# Patient Record
Sex: MALE | Race: WHITE | NOT HISPANIC OR LATINO | Employment: OTHER | URBAN - METROPOLITAN AREA
[De-identification: names, ages, dates, MRNs, and addresses within clinical notes are randomized per-mention and may not be internally consistent; named-entity substitution may affect disease eponyms.]

---

## 2018-09-20 ENCOUNTER — APPOINTMENT (EMERGENCY)
Dept: ULTRASOUND IMAGING | Facility: HOSPITAL | Age: 38
End: 2018-09-20

## 2018-09-20 ENCOUNTER — HOSPITAL ENCOUNTER (EMERGENCY)
Facility: HOSPITAL | Age: 38
Discharge: HOME | End: 2018-09-20
Attending: EMERGENCY MEDICINE | Admitting: EMERGENCY MEDICINE

## 2018-09-20 ENCOUNTER — APPOINTMENT (EMERGENCY)
Dept: CT IMAGING | Facility: HOSPITAL | Age: 38
End: 2018-09-20

## 2018-09-20 VITALS
SYSTOLIC BLOOD PRESSURE: 165 MMHG | RESPIRATION RATE: 18 BRPM | BODY MASS INDEX: 27.2 KG/M2 | TEMPERATURE: 97.6 F | HEIGHT: 70 IN | OXYGEN SATURATION: 94 % | HEART RATE: 103 BPM | WEIGHT: 190 LBS | DIASTOLIC BLOOD PRESSURE: 78 MMHG

## 2018-09-20 DIAGNOSIS — S39.94XA: Primary | ICD-10-CM

## 2018-09-20 LAB
ANION GAP BLD CALC-SCNC: 17 MMOL/L (ref 4–13)
BUN BLD-MCNC: 18 MG/DL (ref 5–25)
CA-I BLD-SCNC: 1.16 MMOL/L (ref 1.12–1.32)
CHLORIDE BLD-SCNC: 103 MMOL/L (ref 100–108)
CREAT BLD-MCNC: 0.7 MG/DL (ref 0.6–1.3)
GFR SERPL CREATININE-BSD FRML MDRD: 120 ML/MIN/1.73SQ M
GLUCOSE SERPL-MCNC: 116 MG/DL (ref 65–140)
HCT VFR BLD CALC: 48 % (ref 36.5–49.3)
HGB BLDA-MCNC: 16.3 G/DL (ref 12–17)
PCO2 BLD: 26 MMOL/L (ref 21–32)
POTASSIUM BLD-SCNC: 4 MMOL/L (ref 3.5–5.3)
SODIUM BLD-SCNC: 140 MMOL/L (ref 136–145)
SPECIMEN SOURCE: ABNORMAL

## 2018-09-20 PROCEDURE — 96361 HYDRATE IV INFUSION ADD-ON: CPT

## 2018-09-20 PROCEDURE — 99284 EMERGENCY DEPT VISIT MOD MDM: CPT

## 2018-09-20 PROCEDURE — 85014 HEMATOCRIT: CPT

## 2018-09-20 PROCEDURE — 96375 TX/PRO/DX INJ NEW DRUG ADDON: CPT

## 2018-09-20 PROCEDURE — 74177 CT ABD & PELVIS W/CONTRAST: CPT

## 2018-09-20 PROCEDURE — 76870 US EXAM SCROTUM: CPT

## 2018-09-20 PROCEDURE — 96374 THER/PROPH/DIAG INJ IV PUSH: CPT

## 2018-09-20 PROCEDURE — 80047 BASIC METABLC PNL IONIZED CA: CPT

## 2018-09-20 RX ORDER — NAPROXEN 500 MG/1
500 TABLET ORAL 2 TIMES DAILY WITH MEALS
Qty: 20 TABLET | Refills: 0 | Status: SHIPPED | OUTPATIENT
Start: 2018-09-20 | End: 2018-09-30

## 2018-09-20 RX ORDER — ONDANSETRON 2 MG/ML
4 INJECTION INTRAMUSCULAR; INTRAVENOUS ONCE
Status: COMPLETED | OUTPATIENT
Start: 2018-09-20 | End: 2018-09-20

## 2018-09-20 RX ORDER — FENTANYL CITRATE 50 UG/ML
50 INJECTION, SOLUTION INTRAMUSCULAR; INTRAVENOUS ONCE
Status: COMPLETED | OUTPATIENT
Start: 2018-09-20 | End: 2018-09-20

## 2018-09-20 RX ORDER — KETOROLAC TROMETHAMINE 30 MG/ML
15 INJECTION, SOLUTION INTRAMUSCULAR; INTRAVENOUS ONCE
Status: COMPLETED | OUTPATIENT
Start: 2018-09-20 | End: 2018-09-20

## 2018-09-20 RX ADMIN — SODIUM CHLORIDE 1000 ML: 0.9 INJECTION, SOLUTION INTRAVENOUS at 10:16

## 2018-09-20 RX ADMIN — ONDANSETRON 4 MG: 2 INJECTION INTRAMUSCULAR; INTRAVENOUS at 11:30

## 2018-09-20 RX ADMIN — HYDROMORPHONE HYDROCHLORIDE 1 MG: 1 INJECTION, SOLUTION INTRAMUSCULAR; INTRAVENOUS; SUBCUTANEOUS at 11:30

## 2018-09-20 RX ADMIN — KETOROLAC TROMETHAMINE 15 MG: 30 INJECTION, SOLUTION INTRAMUSCULAR at 11:30

## 2018-09-20 RX ADMIN — IOHEXOL 100 ML: 350 INJECTION, SOLUTION INTRAVENOUS at 10:25

## 2018-09-20 RX ADMIN — FENTANYL CITRATE 50 MCG: 50 INJECTION INTRAMUSCULAR; INTRAVENOUS at 10:42

## 2018-09-20 NOTE — DISCHARGE INSTRUCTIONS
Testicle Pain   WHAT YOU NEED TO KNOW:   Testicle pain may start in your scrotum and spread to your abdomen  You may have sharp, sudden pain or dull pain that happens over time  Your testicle pain may come and go, or it may last for a long time  The cause of your pain may be unknown  Testicle pain can be caused by infection, trauma, hernia, kidney stones, or sexually transmitted infections (STIs)  You may have a painful lump in your scrotum  The lump may be caused by an enlarged vein or fluid that collects around one of your testicles  This lump also may be caused by a more serious medical condition  Part of your testicle may twist  This is a serious condition that needs treatment as soon as possible  DISCHARGE INSTRUCTIONS:   Medicines:   · Antibiotics: This medicine helps fight or prevent infection  Take your antibiotics until they are gone, even if you feel better  · Pain medicine: You may be given a prescription medicine to decrease pain  Do not wait until the pain is severe before you take this medicine  · NSAIDs:  These medicines decrease swelling, pain, and fever  NSAIDs are available without a doctor's order  Ask your healthcare provider which medicine is right for you  Ask how much to take and when to take it  Take as directed  NSAIDs can cause stomach bleeding and kidney problems if not taken correctly  · Take your medicine as directed  Contact your healthcare provider if you think your medicine is not helping or if you have side effects  Tell him or her if you are allergic to any medicine  Keep a list of the medicines, vitamins, and herbs you take  Include the amounts, and when and why you take them  Bring the list or the pill bottles to follow-up visits  Carry your medicine list with you in case of an emergency  Decrease discomfort:  With treatment, your pain may improve within 1 to 3 days   Depending on the cause of your testicle pain, your condition may take up to 4 weeks to heal   · Rest:  Limit your activity until your pain decreases  Get more rest while you heal  Do not sit for long periods of time  · Cold packs:  Place cold packs on your testicles to help ease your pain  Use cold packs as directed  · Elevation:  Gently tuck a folded towel under your testicles to lift them as you sit in a chair or lie in bed  This will help ease your pain and decrease swelling  Follow up with your healthcare provider or urologist in 3 to 7 days:  Write down your questions so you remember to ask them during your visits  Sexual activity:  Avoid sexual activity until you have finished your antibiotics or until your healthcare provider tells you it is safe to have sex  Use condoms to lower your risk of STIs  Contact your healthcare provider or urologist if:   · You feel that your medicine or treatment is not working  · You feel more pain, tenderness, or swelling than before  · You have nausea or a low fever  · You have questions or concerns about your condition or care  Return to the emergency department if:   · You have sudden or severe pain in your testicles or abdomen  · You have pain in both testicles  · You are vomiting  · You have a high fever  · Your pain increases when you elevate your testicles  · Your scrotum turns blue  This could mean your testicle is not getting the blood flow it needs  © 2017 [address]  Information is for End User's use only and may not be sold, redistributed or otherwise used for commercial purposes  All illustrations and images included in CareNotes® are the copyrighted property of A [employer]  or George Brush  The above information is an  only  It is not intended as medical advice for individual conditions or treatments  Talk to your doctor, nurse or pharmacist before following any medical regimen to see if it is safe and effective for you        Testicular Torsion   WHAT YOU NEED TO KNOW: What is testicular torsion? Testicular torsion is a condition in which the spermatic cord that holds the testicle gets twisted  The spermatic cord contains blood vessels and passageways for sperm  When the spermatic cord is twisted, blood flow to the testicle is reduced or blocked  This condition usually happens to only one testicle, but can happen to both  It usually affects babies up to 3 year of age and children 15to 25years of age  What causes testicular torsion? The cause of this condition is not always known  A birth defect may cause it, and symptoms may only appear as you get older  You may have this condition if you play sports, exercise, or have an injury near your groin  Cold weather may also increase your risk  What are the signs and symptoms of testicular torsion? · Severe pain and tenderness in your scrotum    · Red and swollen scrotum    · Testicles that appear to hang a bit higher than usual    · Nausea and vomiting    · Fever  How is testicular torsion diagnosed? Your healthcare provider will do a physical examination  He may ask you questions about your health and your symptoms  You may need the following tests:  · Ultrasound: An ultrasound uses sound waves to show pictures on a monitor  An ultrasound may show problems in your testicles and spermatic cord, including abnormal blood flow  · Scintigraphy: This test uses radioactive dye to check for blood flow in the spermatic cord and scrotum  The dye helps the blood vessels show up better on the x-rays  Tell the healthcare provider if you have ever had an allergic reaction to contrast dye  How is testicular torsion treated? You must see a healthcare provider as soon as possible  Your healthcare provider may try to untwist the spermatic cord by hand  Your healthcare provider may also give you medicine to decrease any pain or swelling  If your healthcare provider cannot untwist it by hand, you may need surgery   Your healthcare provider may have to make an incision on your scrotum to reach and untwist the affected testicle  Your healthcare provider may then attach the affected testicle to the wall of your scrotum to prevent it from twisting again  The unaffected testicle may also be attached to the scrotum to prevent testicular torsion  What are the risks of testicular torsion? If you have surgery, you may bleed more than expected or get an infection  Even after treatment, your testicle may get smaller or have decreased sperm and hormone production  With or without treatment, the lack of blood flow to the testicle may lead to an infection  The testicle may shrink  The testicle may die and need to be removed completely  This may make it difficult for you to get a woman pregnant  If both testicles need to be removed, you will be sterile (unable to get a woman pregnant)  When should I contact my healthcare provider? · You have a fever  · Your skin is itchy, swollen, or has a rash  · You have questions or concerns about your condition or care  When should I seek immediate care? · You have increased pain, swelling, or redness in your scrotum  CARE AGREEMENT:   You have the right to help plan your care  Learn about your health condition and how it may be treated  Discuss treatment options with your caregivers to decide what care you want to receive  You always have the right to refuse treatment  The above information is an  only  It is not intended as medical advice for individual conditions or treatments  Talk to your doctor, nurse or pharmacist before following any medical regimen to see if it is safe and effective for you  © 2017 [address] Information is for End User's use only and may not be sold, redistributed or otherwise used for commercial purposes  All illustrations and images included in CareNotes® are the copyrighted property of [employer]  or George Pichardo Abdominal Injury   WHAT YOU NEED TO KNOW:   A blunt abdominal injury is a direct blow to the abdomen without an open wound  These injuries are caused by car accidents, sports injuries, or a fall  Organs such as your pancreas, liver, spleen, or bladder may be injured  Your intestines may also be injured  These injuries may cause internal bleeding  DISCHARGE INSTRUCTIONS:   Call 911 for any of the following:   · You feel weak, lightheaded, or you faint  · You have a fast heartbeat, fast breathing, and pale, sweaty skin  · You have new or severe pain, swelling, or firmness in your abdomen  Seek care immediately if:   · You have nausea and are vomiting  · You have blood in your urine or bowel movement  Contact your healthcare provider if:   · You have questions or concerns about your condition or care  Medicines:   · NSAIDs  help decrease swelling and pain or fever  This medicine is available with or without a doctor's order  NSAIDs can cause stomach bleeding or kidney problems in certain people  If you take blood thinner medicine, always ask your healthcare provider if NSAIDs are safe for you  Always read the medicine label and follow directions  · Take your medicine as directed  Contact your healthcare provider if you think your medicine is not helping or if you have side effects  Tell him or her if you are allergic to any medicine  Keep a list of the medicines, vitamins, and herbs you take  Include the amounts, and when and why you take them  Bring the list or the pill bottles to follow-up visits  Carry your medicine list with you in case of an emergency  Apply ice:  Ice helps to decrease swelling and pain  Ice may also help prevent tissue damage  Use an ice pack, or put crushed ice in a plastic bag  Cover it with a towel and place it on your injured area for 15 to 20 minutes every hour or as directed    Limit activity as directed: Decrease pain, swelling, and prevent other injuries by limiting activity  Do not play sports or exercise until your healthcare provider says it is okay  Follow up with your healthcare provider as directed:  Write down your questions so you remember to ask them during your visits  © 2017 [address] Information is for End User's use only and may not be sold, redistributed or otherwise used for commercial purposes  All illustrations and images included in CareNotes® are the copyrighted property of [employer] M [employer]  or George Brush  The above information is an  only  It is not intended as medical advice for individual conditions or treatments  Talk to your doctor, nurse or pharmacist before following any medical regimen to see if it is safe and effective for you  RICE Therapy   WHAT YOU NEED TO KNOW:   What is RICE therapy? RICE therapy is a 4-step process used to reduce swelling and pain from an injury  RICE stands for rest, ice, compression, and elevation  RICE should be done within the first 24 to 48 hours after an injury  How do I use RICE therapy? · Rest  the injured area so that it can heal  You may need to avoid putting any weight on your injury for at least 48 hours  Return to normal activities as directed  · Ice  the injury for 20 minutes every 4 hours, or as directed  Use an ice pack, or put crushed ice in a plastic bag  Cover it with a towel to protect your skin  Ice helps prevent tissue damage and decreases swelling and pain  · Compress  the injury with an elastic bandage, air cast, special boot, or splint to reduce swelling  Ask your healthcare provider which compression device to use, and how tight it should be  · Elevate  the injured area above the level of your heart as often as you can  This will help decrease swelling and pain  If possible, prop the injured area on pillows or blankets to keep it elevated comfortably  When should I contact my healthcare provider?    · Your pain does not decrease, even after treatment  · You have questions or concerns about your condition or care  When should I seek immediate care? · You have severe pain in the injured area  · You have numbness in the injured area  · You cannot put any weight on or move the injured area  CARE AGREEMENT:   You have the right to help plan your care  Learn about your health condition and how it may be treated  Discuss treatment options with your caregivers to decide what care you want to receive  You always have the right to refuse treatment  The above information is an  only  It is not intended as medical advice for individual conditions or treatments  Talk to your doctor, nurse or pharmacist before following any medical regimen to see if it is safe and effective for you  © 2017 [address] [employer] Information is for End User's use only and may not be sold, redistributed or otherwise used for commercial purposes  All illustrations and images included in CareNotes® are the copyrighted property of [employer]  or George Brush
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
